# Patient Record
Sex: MALE | Race: WHITE | RURAL
[De-identification: names, ages, dates, MRNs, and addresses within clinical notes are randomized per-mention and may not be internally consistent; named-entity substitution may affect disease eponyms.]

---

## 2017-12-08 ENCOUNTER — TELEPHONE (OUTPATIENT)
Dept: SURGERY | Age: 47
End: 2017-12-08

## 2018-10-22 ENCOUNTER — PREP FOR PROCEDURE (OUTPATIENT)
Dept: SURGERY | Age: 48
End: 2018-10-22

## 2018-10-22 ENCOUNTER — OFFICE VISIT (OUTPATIENT)
Dept: SURGERY | Age: 48
End: 2018-10-22
Payer: MEDICAID

## 2018-10-22 VITALS
SYSTOLIC BLOOD PRESSURE: 152 MMHG | HEIGHT: 70 IN | RESPIRATION RATE: 18 BRPM | DIASTOLIC BLOOD PRESSURE: 92 MMHG | TEMPERATURE: 98.5 F | BODY MASS INDEX: 26.7 KG/M2 | WEIGHT: 186.5 LBS | HEART RATE: 97 BPM

## 2018-10-22 DIAGNOSIS — K92.1 HEMATOCHEZIA: Primary | ICD-10-CM

## 2018-10-22 PROCEDURE — 99244 OFF/OP CNSLTJ NEW/EST MOD 40: CPT | Performed by: SURGERY

## 2018-10-22 PROCEDURE — G8419 CALC BMI OUT NRM PARAM NOF/U: HCPCS | Performed by: SURGERY

## 2018-10-22 PROCEDURE — G8428 CUR MEDS NOT DOCUMENT: HCPCS | Performed by: SURGERY

## 2018-10-22 PROCEDURE — 99204 OFFICE O/P NEW MOD 45 MIN: CPT

## 2018-10-22 PROCEDURE — G8484 FLU IMMUNIZE NO ADMIN: HCPCS | Performed by: SURGERY

## 2018-10-22 RX ORDER — LEVOTHYROXINE SODIUM 0.05 MG/1
50 TABLET ORAL DAILY
COMMUNITY

## 2018-10-22 RX ORDER — OMEGA-3 FATTY ACIDS CAP DELAYED RELEASE 1000 MG 1000 MG
3000 CAPSULE DELAYED RELEASE ORAL
COMMUNITY

## 2018-10-22 RX ORDER — SODIUM CHLORIDE, SODIUM LACTATE, POTASSIUM CHLORIDE, CALCIUM CHLORIDE 600; 310; 30; 20 MG/100ML; MG/100ML; MG/100ML; MG/100ML
INJECTION, SOLUTION INTRAVENOUS CONTINUOUS
Status: CANCELLED | OUTPATIENT
Start: 2018-10-22

## 2018-10-22 RX ORDER — SODIUM CHLORIDE 0.9 % (FLUSH) 0.9 %
10 SYRINGE (ML) INJECTION EVERY 12 HOURS SCHEDULED
Status: CANCELLED | OUTPATIENT
Start: 2018-10-22

## 2018-10-22 RX ORDER — SIMVASTATIN 20 MG
20 TABLET ORAL NIGHTLY
COMMUNITY

## 2018-10-22 RX ORDER — HYDROXYZINE PAMOATE 25 MG/1
25 CAPSULE ORAL 2 TIMES DAILY
COMMUNITY

## 2018-10-22 RX ORDER — CITALOPRAM 40 MG/1
40 TABLET ORAL DAILY
COMMUNITY

## 2018-10-22 RX ORDER — CHOLECALCIFEROL (VITAMIN D3) 125 MCG
1 TABLET ORAL DAILY
COMMUNITY

## 2018-10-22 RX ORDER — LISINOPRIL 10 MG/1
10 TABLET ORAL DAILY
COMMUNITY

## 2018-10-22 RX ORDER — CYANOCOBALAMIN 1000 UG/ML
1000 INJECTION INTRAMUSCULAR; SUBCUTANEOUS ONCE
COMMUNITY

## 2018-10-22 RX ORDER — SODIUM CHLORIDE 0.9 % (FLUSH) 0.9 %
10 SYRINGE (ML) INJECTION PRN
Status: CANCELLED | OUTPATIENT
Start: 2018-10-22

## 2018-10-22 ASSESSMENT — ENCOUNTER SYMPTOMS
GASTROINTESTINAL NEGATIVE: 1
EYES NEGATIVE: 1
RESPIRATORY NEGATIVE: 1

## 2018-10-22 NOTE — LETTER
REQUEST AND INFORMED CONSENT FOR SURGICAL AND/OR DIAGNOSTIC PROCEDURE    Name: Collette Hamburg   : 1970  Age: 50 y.o. Sex: male   MRN: [id number]  Guarantor Acct: [de-identified]   Clinic: [address]   Physician: Dr. Demetria Melendrez ,hereby authorize Andressa Lerma M.D., and his assistants that may be selected by him to administer such treatment necessary and to perform the following test/procedure/surgical procedure:    Colonoscopy with possible biopsies and/or polypectomy    The procedure necessary to treat my condition, as well as the alternate methods of treatment, and the attendant risks and consequences that are associated with the above have been explained to me by the physician. I have been informed there may be other risks from known and/or unknown causes that the practices of medicine and surgery is not an exact science, and I acknowledge that no guarantees have been made to me concerning the results of this procedure. The physician and/or their assistants have satisfactorily answered all questions, which I have asked about the procedure, in terms that I fully understand. I consent to the administration of anesthetic agents or medication deemed appropriate and desirable by the physician. I authorize and request the physician and his assistant(s) to perform any other tests/procedures in the event that unforeseen conditions are revealed as may be necessary and desirable in the exercise of his professional judgement. This authorization shall extend to treating all conditions that require treatment and are not known at the time this procedure commences.     By signing this form, I acknowledge that I have read it carefully or had it read or explained to me and that I understand this form and its content, and I hereby voluntarily consent to the above.      ________________________________  10/22/2018 2:08 PM   Patient or nearest relative ________________________________  Relationship      ________________________________  Witness

## 2018-10-22 NOTE — PROGRESS NOTES
Patient here today with complaints of hemorrhoids, for several years. He complains of anal leakage due to the hemorrhoids. He denies any constipation, diarrhea, nausea, vomiting, abdominal pain or bloody stool.
have discussed the possible risks and complications, including the remote chance of perforation, and the patient appears to understand and gives informed consent  2. We are scheduling a colonoscopy at [facility] on 11/07/18. 3. The patient will take the outpatient Miralax prep. Copy of this consult has been faxed to AFIA Delong CNP; thank you for the opportunity to participate in this patient's care.     Electronically signed by Karilyn Osgood, MD on 10/22/2018 at 2:20 PM